# Patient Record
Sex: FEMALE | Race: WHITE
[De-identification: names, ages, dates, MRNs, and addresses within clinical notes are randomized per-mention and may not be internally consistent; named-entity substitution may affect disease eponyms.]

---

## 2018-08-15 LAB
HCT VFR BLD CALC: 36.7 % (ref 36–45)
LYMPHOCYTES # SPEC AUTO: 2 K/UL (ref 0.7–4.9)
MCH RBC QN AUTO: 27.7 PG (ref 27–35)
MCV RBC: 81.1 FL (ref 80–100)
PMV BLD: 9.2 FL (ref 7.6–11.3)
RBC # BLD: 4.53 M/UL (ref 3.86–4.86)
UA COMPLETE W REFLEX CULTURE PNL UR: (no result)
UA DIPSTICK W REFLEX MICRO PNL UR: (no result)

## 2018-08-15 NOTE — EKG
Test Date:    2018-08-15               Test Time:    12:57:40

Technician:   NICOLÁS                                    

                                                     

MEASUREMENT RESULTS:                                       

Intervals:                                           

Rate:         78                                     

SD:           168                                    

QRSD:         90                                     

QT:           404                                    

QTc:          460                                    

Axis:                                                

P:            50                                     

SD:           168                                    

QRS:          -1                                     

T:            8                                      

                                                     

INTERPRETIVE STATEMENTS:                                       

                                                     

Normal sinus rhythm

Normal ECG

No previous ECG available for comparison



Electronically Signed On 08-15-18 14:25:53 CDT by Quique Zamorano

## 2018-08-21 ENCOUNTER — HOSPITAL ENCOUNTER (OUTPATIENT)
Dept: HOSPITAL 97 - OR | Age: 38
Discharge: HOME | End: 2018-08-21
Attending: OBSTETRICS & GYNECOLOGY
Payer: COMMERCIAL

## 2018-08-21 DIAGNOSIS — N93.0: Primary | ICD-10-CM

## 2018-08-21 DIAGNOSIS — Z30.2: ICD-10-CM

## 2018-08-21 DIAGNOSIS — I10: ICD-10-CM

## 2018-08-21 DIAGNOSIS — Z30.432: ICD-10-CM

## 2018-08-21 PROCEDURE — 0DNU4ZZ RELEASE OMENTUM, PERCUTANEOUS ENDOSCOPIC APPROACH: ICD-10-PCS

## 2018-08-21 PROCEDURE — 86850 RBC ANTIBODY SCREEN: CPT

## 2018-08-21 PROCEDURE — 86900 BLOOD TYPING SEROLOGIC ABO: CPT

## 2018-08-21 PROCEDURE — 87088 URINE BACTERIA CULTURE: CPT

## 2018-08-21 PROCEDURE — 36415 COLL VENOUS BLD VENIPUNCTURE: CPT

## 2018-08-21 PROCEDURE — 93005 ELECTROCARDIOGRAM TRACING: CPT

## 2018-08-21 PROCEDURE — 87086 URINE CULTURE/COLONY COUNT: CPT

## 2018-08-21 PROCEDURE — 81015 MICROSCOPIC EXAM OF URINE: CPT

## 2018-08-21 PROCEDURE — 81025 URINE PREGNANCY TEST: CPT

## 2018-08-21 PROCEDURE — 0UT74ZZ RESECTION OF BILATERAL FALLOPIAN TUBES, PERCUTANEOUS ENDOSCOPIC APPROACH: ICD-10-PCS

## 2018-08-21 PROCEDURE — 81003 URINALYSIS AUTO W/O SCOPE: CPT

## 2018-08-21 PROCEDURE — 88302 TISSUE EXAM BY PATHOLOGIST: CPT

## 2018-08-21 PROCEDURE — 85025 COMPLETE CBC W/AUTO DIFF WBC: CPT

## 2018-08-21 PROCEDURE — 86901 BLOOD TYPING SEROLOGIC RH(D): CPT

## 2018-08-21 PROCEDURE — 0UC98ZZ EXTIRPATION OF MATTER FROM UTERUS, VIA NATURAL OR ARTIFICIAL OPENING ENDOSCOPIC: ICD-10-PCS

## 2018-08-21 RX ADMIN — MORPHINE SULFATE ONE MG: 4 INJECTION, SOLUTION INTRAMUSCULAR; INTRAVENOUS at 09:23

## 2018-08-21 RX ADMIN — MORPHINE SULFATE ONE MG: 4 INJECTION, SOLUTION INTRAMUSCULAR; INTRAVENOUS at 09:28

## 2018-08-21 NOTE — OP
Date of Procedure:  08/21/2018



Surgeon:  Colleen Harmon MD



Assistant:  Antonina Franco.



Preoperative Diagnoses:  Retained IUD with short strings and desired sterilization.



Postoperative Diagnoses:  Retained IUD with short strings desired sterilization; and left tubal adhes
ions and omental.



Procedure Performed:  Diagnostic hysteroscopy, then converted to an operative hysteroscopy for remova
l of IUD with the graspers; laparoscopy; bilateral salpingectomy; and lysis of adhesions.



Anesthesia:  General endotracheal.



Specimens:  Bilateral tubes.



Estimated Blood Loss:  Minimal.



Complications:  No complications.



Drains:  None.



Condition:  The patient's condition is stable.



Indications:  The patient is a 38-year-old, completed childbearing with birth control with Mirena in 
place, however, wanted permanent sterilization; does have some pain.  We got consent for removal of t
he tubes or tubal ligation depending on the findings if there were any adhesions or any other patholo
gy that the tubes would be removed, otherwise I would put Falope rings. 



An attempt at removal of IUD in the office failed despite multiple attempts to try to get the strings
 from the canal.  So, a hysteroscopy was planned for removal of IUD.



Description Of Procedure:  After informed consent was verified, she was taken back to the OR, placed 
in a supine fashion on the operating table.  After general anesthesia was given, she was placed in a 
dorsal lithotomy position using Iftikhar stirrups.  Abdomen, perineum, vulva vagina were all prepped and
 draped in a sterile fashion.  Brar was placed to drain the bladder.  A speculum was used to expose 
the cervix.  Anterior lip grasped with 2 Allis clamps, and diagnostic hysteroscopy was performed.  Th
e IUD seen in the uterine cavity.  Then, the sheath changed to an operative sheath.  Using graspers, 
the strings that were tucked into the uterine cavity were pulled out and the entire IUD came out with
out any problem.  The cavity was then visualized after the IUD was pulled out in total.  There is nor
mal cavity without any intracavitary lesions.  Both tubal ostia were visualized.  The scope removed a
nd diagnostic VCare placed. 



A 1-cm infraumbilical incision was made using the scalpel; the fascial edges were tagged with 0 Vicry
l.  Peritoneum entered bluntly and S retractors placed.  Maryellen introduced.  Site of entry was checke
d and was unremarkable.  Liver and gallbladder were checked as well and normal.  The patient was plac
ed in [facility].  There were adhesions to the anterior abdominal wall and the cystic structure here from
 an adhesion of the left tube to the omentum and the omentum to the anterior abdominal wall.  These a
dhesions were taken down, excised.  Then, the tubes were visualized.  Plan was to see if there was an
y pathology to remove the tubes, slightly dilated and erythematous on the left side.  After the 2 sup
rapubic and left lower quadrant port, 5 mm, ports were placed under direct vision, we went ahead and 
then did the excision of the adhesions.  Then, using the basket tip bipolar, the mesosalpinx was caut
erized all the way and the tube was cut with scissors.  First, the right, then the left tubes were re
moved and handed out for permanent pathology.  Thorough inspection showed excellent hemostasis.  Ovar
ies normal.  No other pathology was found.  The slight cystic change on the serosal edge of the poste
rior aspect of the uterus with no endometriosis was seen. 



The trocars were removed under direct vision, gas desufflated, Maryellen removed.  Fascia closed with 0 
Vicryl in a figure-of-eight fashion.  Subcutaneous tissue was brought with a simple 0 Vicryl stitch. 
 The skin incisions, all 3 closed with 4-0 Monocryl.  VCare and Brar were removed.  The patient robert
shivani from anesthesia after instrument, needle, and sponge counts x3 were correct at the end of the c
ase.  The 

patient tolerated the procedure well.  She will follow up with me in 1 week.





SK/LAYNE

DD:  08/21/2018 09:07:29Voice ID:  [id number]

DT:  08/21/2018 19:24:15Report ID:  [id number]

## 2019-07-16 ENCOUNTER — HOSPITAL ENCOUNTER (INPATIENT)
Dept: HOSPITAL 97 - ER | Age: 39
LOS: 2 days | Discharge: HOME | DRG: 419 | End: 2019-07-18
Attending: FAMILY MEDICINE | Admitting: HOSPITALIST
Payer: COMMERCIAL

## 2019-07-16 DIAGNOSIS — K80.00: Primary | ICD-10-CM

## 2019-07-16 DIAGNOSIS — I10: ICD-10-CM

## 2019-07-16 LAB
ALBUMIN SERPL BCP-MCNC: 4 G/DL (ref 3.4–5)
ALP SERPL-CCNC: 74 U/L (ref 45–117)
ALT SERPL W P-5'-P-CCNC: 88 U/L (ref 12–78)
AST SERPL W P-5'-P-CCNC: 80 U/L (ref 15–37)
BUN BLD-MCNC: 10 MG/DL (ref 7–18)
GLUCOSE SERPLBLD-MCNC: 119 MG/DL (ref 74–106)
HCT VFR BLD CALC: 38.9 % (ref 36–45)
LIPASE SERPL-CCNC: 165 U/L (ref 73–393)
LYMPHOCYTES # SPEC AUTO: 1.7 K/UL (ref 0.7–4.9)
PMV BLD: 9.4 FL (ref 7.6–11.3)
POTASSIUM SERPL-SCNC: 3.4 MMOL/L (ref 3.5–5.1)
RBC # BLD: 4.81 M/UL (ref 3.86–4.86)

## 2019-07-16 PROCEDURE — 88304 TISSUE EXAM BY PATHOLOGIST: CPT

## 2019-07-16 PROCEDURE — 81025 URINE PREGNANCY TEST: CPT

## 2019-07-16 PROCEDURE — 96375 TX/PRO/DX INJ NEW DRUG ADDON: CPT

## 2019-07-16 PROCEDURE — 96365 THER/PROPH/DIAG IV INF INIT: CPT

## 2019-07-16 PROCEDURE — 80076 HEPATIC FUNCTION PANEL: CPT

## 2019-07-16 PROCEDURE — 80048 BASIC METABOLIC PNL TOTAL CA: CPT

## 2019-07-16 PROCEDURE — 36415 COLL VENOUS BLD VENIPUNCTURE: CPT

## 2019-07-16 PROCEDURE — 74181 MRI ABDOMEN W/O CONTRAST: CPT

## 2019-07-16 PROCEDURE — 85025 COMPLETE CBC W/AUTO DIFF WBC: CPT

## 2019-07-16 PROCEDURE — 96361 HYDRATE IV INFUSION ADD-ON: CPT

## 2019-07-16 PROCEDURE — 81003 URINALYSIS AUTO W/O SCOPE: CPT

## 2019-07-16 PROCEDURE — 99285 EMERGENCY DEPT VISIT HI MDM: CPT

## 2019-07-16 PROCEDURE — 96366 THER/PROPH/DIAG IV INF ADDON: CPT

## 2019-07-16 PROCEDURE — 80053 COMPREHEN METABOLIC PANEL: CPT

## 2019-07-16 PROCEDURE — 83690 ASSAY OF LIPASE: CPT

## 2019-07-16 PROCEDURE — 76705 ECHO EXAM OF ABDOMEN: CPT

## 2019-07-16 RX ADMIN — SODIUM CHLORIDE SCH MLS: 0.9 INJECTION, SOLUTION INTRAVENOUS at 22:20

## 2019-07-16 RX ADMIN — Medication SCH ML: at 22:21

## 2019-07-16 NOTE — EDPHYS
Physician Documentation                                                                           

 [facility]                                                                 

Name: Oumou Alcala                                                                             

Age: 39 yrs                                                                                       

Sex: Female                                                                                       

: 1980                                                                                   

MRN: [id number]                                                                                   

Arrival Date: 2019                                                                          

Time: 15:11                                                                                       

Account#: [id number]                                                                            

Bed CT                                                                                            

Private MD:                                                                                       

ED Physician Vern Spears                                                                      

HPI:                                                                                              

                                                                                             

16:00 This 39 yrs old  Female presents to ER via EMS with complaints of Abdominal    pm1 

      Pain, Nausea/Vomiting.                                                                      

16:00 The patient presents with abdominal pain in the epigastric area. Onset: The             pm1 

      symptoms/episode began/occurred 1 hour prior to arrival. The symptoms radiate to            

      Associated signs and symptoms: Pertinent positives: nausea and vomiting, Pertinent          

      negatives: chest pain, constipation, diarrhea, dysuria, fever, shortness of breath. The     

      symptoms are described as crampy, sharp. Modifying factors: The symptoms are alleviated     

      by nothing, the symptoms are aggravated by nothing. Severity of pain: in the emergency      

      department the pain is actually worse. The patient has not experienced similar symptoms     

      in the past. The patient has not recently seen a physician.                                 

                                                                                                  

Historical:                                                                                       

- Allergies:                                                                                      

15:13 TETRACYCLINES;                                                                          ss  

- Home Meds:                                                                                      

15:13 amlodipine oral [Active];                                                               ss  

- PMHx:                                                                                           

15:13 Hypertension;                                                                           ss  

- PSHx:                                                                                           

15:13 Tubal ligation;                                                                         ss  

                                                                                                  

- Immunization history:: Adult Immunizations up to date.                                          

- Social history:: Smoking status: Patient/guardian denies using tobacco.                         

- Ebola Screening: : Patient denies exposure to infectious person Patient denies travel           

  to an Ebola-affected area in the 21 days before illness onset.                                  

                                                                                                  

                                                                                                  

ROS:                                                                                              

16:00 Constitutional: Negative for fever, chills, and weight loss, Eyes: Negative for injury, pm1 

      pain, redness, and discharge, ENT: Negative for injury, pain, and discharge, Neck:          

      Negative for injury, pain, and swelling, Cardiovascular: Negative for chest pain,           

      palpitations, and edema, Respiratory: Negative for shortness of breath, cough,              

      wheezing, and pleuritic chest pain.                                                         

16:00 Back: Negative for injury and pain, : Negative for injury, bleeding, discharge, and       

      swelling, MS/Extremity: Negative for injury and deformity, Skin: Negative for injury,       

      rash, and discoloration, Neuro: Negative for headache, weakness, numbness, tingling,        

      and seizure.                                                                                

16:00 Abdomen/GI: Positive for abdominal pain, nausea and vomiting, Negative for diarrhea,        

      constipation.                                                                               

                                                                                                  

Exam:                                                                                             

16:00 Constitutional:  This is a well developed, well nourished patient who is awake, alert,  pm1 

      and in no acute distress. Head/Face:  Normocephalic, atraumatic. Eyes:  Pupils equal        

      round and reactive to light, extra-ocular motions intact.  Lids and lashes normal.          

      Conjunctiva and sclera are non-icteric and not injected.  Cornea within normal limits.      

      Periorbital areas with no swelling, redness, or edema. ENT:  Nares patent. No nasal         

      discharge, no septal abnormalities noted.  Tympanic membranes are normal and external       

      auditory canals are clear.  Oropharynx with no redness, swelling, or masses, exudates,      

      or evidence of obstruction, uvula midline.  Mucous membranes moist. Neck:  Trachea          

      midline, no thyromegaly or masses palpated, and no cervical lymphadenopathy.  Supple,       

      full range of motion without nuchal rigidity, or vertebral point tenderness.  No            

      Meningismus. Chest/axilla:  Normal chest wall appearance and motion.  Nontender with no     

      deformity.  No lesions are appreciated. Cardiovascular:  Regular rate and rhythm with a     

      normal S1 and S2.  No gallops, murmurs, or rubs.  Normal PMI, no JVD.  No pulse             

      deficits. Respiratory:  Lungs have equal breath sounds bilaterally, clear to                

      auscultation and percussion.  No rales, rhonchi or wheezes noted.  No increased work of     

      breathing, no retractions or nasal flaring.                                                 

16:00 Back:  No spinal tenderness.  No costovertebral tenderness.  Full range of motion.          

      Skin:  Warm, dry with normal turgor.  Normal color with no rashes, no lesions, and no       

      evidence of cellulitis. MS/ Extremity:  Pulses equal, no cyanosis.  Neurovascular           

      intact.  Full, normal range of motion.                                                      

16:00 Abdomen/GI: Inspection: abdomen appears normal, Bowel sounds: normal, Palpation: soft,      

      moderate abdominal tenderness, in the epigastric area, mass, is not appreciated,            

      rebound tenderness, is not appreciated.                                                     

16:00 Neuro: Orientation: is normal, Motor: is normal, moves all fours, Sensation: is normal,     

      no obvious gross deficits.                                                                  

                                                                                                  

Vital Signs:                                                                                      

15:13  / 71; Pulse 79; Resp 20; Temp 98.5(O); Pulse Ox 100% on R/A; Weight 87.54 kg;    ss  

      Height 5 ft. 6 in. (167.64 cm);                                                             

16:53  / 80; Pulse 74; Resp 16; Pulse Ox 98% on R/A;                                    la1 

18:55  / 70; Pulse 62; Resp 16; Pulse Ox 98% on R/A;                                    la1 

19:56  / 72; Pulse 61; Resp 16; Pulse Ox 96% on R/A;                                    jb4 

20:45  / 74; Pulse 63; Resp 16; Temp 99.1(O); Pulse Ox 98% on R/A;                      jb4 

15:13 Body Mass Index 31.15 (87.54 kg, 167.64 cm)                                             ss  

                                                                                                  

MDM:                                                                                              

15:26 Patient medically screened.                                                             [facility] 

16:50 Data reviewed: vital signs. Data interpreted: Pulse oximetry: on room air is 100 %.     pm1 

      Interpretation: normal. Counseling: I had a detailed discussion with the patient and/or     

      guardian regarding: the historical points, exam findings, and any diagnostic results        

      supporting the discharge/admit diagnosis, lab results, radiology results, the need for      

      further work-up and treatment in the hospital.                                              

17:14 Physician consultation: Oliver Reynolds MD was called at 16:57, was contacted at 17:15,  pm1 

      regarding consult, patient's condition, and will see patient tomorrow, would like           

      admission per Dr. Elzbieta Kee MD would like further tests performed, MRCP, GI consult     

      - Catarino is oncall, NPO, IV fluids, abx - zosyn.                                            

17:50 Physician consultation: Elzbieta Kee MD was called at 17:15, was contacted at 17:50,   pm1 

      regarding admission, wants to know results of MRCP prior to admission.                      

19:45 Physician consultation: Mary Medina MD was called at 19:48, was contacted at 19:45,   pm1 

      regarding admission, patient's condition.                                                   

                                                                                                  

                                                                                             

15:23 Order name: Urine Dipstick--Ancillary (enter results); Complete Time: 17:19             bd  

                                                                                             

15:23 Order name: Urine Pregnancy--Ancillary (enter results); Complete Time: 17:19            bd  

                                                                                             

15:39 Order name: Basic Metabolic Panel; Complete Time: 16:25                                 pm1 

                                                                                             

15:39 Order name: CBC with Diff; Complete Time: 16:03                                         pm1 

                                                                                             

15:39 Order name: Creatinine for Radiology; Complete Time: 16:15                              pm1 

                                                                                             

15:39 Order name: Hepatic Function; Complete Time: 16:25                                      pm1 

                                                                                             

15:39 Order name: Lipase; Complete Time: 16:25                                                pm1 

                                                                                             

15:39 Order name: US Abdomen Limited; Complete Time: 16:41                                    pm1 

                                                                                             

16:57 Order name: Cholangiogram; Complete Time: 19:38                                         EDMS

                                                                                             

15:39 Order name: IV Saline Lock; Complete Time: 15:52                                        pm1 

                                                                                             

15:39 Order name: Labs collected and sent; Complete Time: 15:52                               pm1 

                                                                                             

20:22 Order name: CONS Pharmacy Consult                                                       EDMS

                                                                                             

20:22 Order name: CONS Physician Consult                                                      EDMS

                                                                                             

20:22 Order name: CONS Physician Consult                                                      EDMS

                                                                                             

20:22 Order name: NPO                                                                         EDMS

                                                                                                  

Administered Medications:                                                                         

15:51 Drug: NS 0.9% 1000 ml Route: IV; Rate: 1000 ml; Site: left antecubital;                 la1 

16:53 Follow up: IV Status: Completed infusion                                                la1 

15:51 Drug: Zofran 4 mg Route: IVP; Site: left antecubital;                                   la1 

16:53 Follow up: Response: No adverse reaction                                                la1 

15:52 Drug: morphine 4 mg Route: IVP; Site: left antecubital;                                 la1 

16:53 Follow up: Response: No adverse reaction; Pain is decreased                             la1 

16:53 Drug: Zosyn 3.375 grams Route: IVPB; Infused Over: 60 mins; Site: left antecubital;     la1 

18:55 Follow up: IV Status: Completed infusion                                                la1 

17:06 Drug: morphine 4 mg Route: IVP; Site: left antecubital;                                 la1 

18:55 Follow up: Response: No adverse reaction; Pain is decreased                             la1 

20:49 Drug: morphine 4 mg Route: IVP; Site: left antecubital;                                 jb4 

21:00 Follow up: Response: No adverse reaction; Pain is decreased                             jb4 

                                                                                                  

                                                                                                  

Disposition:                                                                                      

                                                                                             

05:51 Co-signature as Attending Physician, Vern Spears MD I agree with the assessment and  gretchen 

      plan of care.                                                                               

                                                                                                  

Disposition:                                                                                      

19 17:19 Hospitalization ordered by Mary Medina for Observation. Preliminary             

  diagnosis is Cholelithiasis.                                                                    

- Bed requested for Telemetry/MedSurg (observation).                                              

- Status is Observation.                                                                      jb4 

- Condition is Stable.                                                                            

- Problem is new.                                                                                 

- Symptoms have improved.                                                                         

UTI on Admission? No                                                                              

                                                                                                  

                                                                                                  

                                                                                                  

Signatures:                                                                                       

Dispatcher [employer]                           EDMS                                                 

Arlene Brown RN RN mw Anderson, Corey, MD MD cha Smirch, Shelby, RN RN                                                      

Karthik Ames RN                         RN   la1                                                  

Gurmeet Figueroa, NP                    NP   pm1                                                  

Taras Aggarwal RN                       RN   jb4                                                  

                                                                                                  

Corrections: (The following items were deleted from the chart)                                    

                                                                                             

19:46 17:19 Hospitalization Ordered by Elzbieta Kee MD for Observation. Preliminary          pm1 

      diagnosis is Cholelithiasis. Bed requested for Telemetry/MedSurg (observation). Status      

      is Observation. Condition is Stable. Problem is new. Symptoms have improved. UTI on         

      Admission? No. pm1                                                                          

20:27 19:46 2019 17:19 Hospitalization Ordered by Mary Medina MD for Observation.     mw  

      Preliminary diagnosis is Cholelithiasis. Bed requested for Telemetry/MedSurg                

      (observation). Status is Observation. Condition is Stable. Problem is new. Symptoms         

      have improved. UTI on Admission? No. pm1                                                    

21:29 20:27 2019 17:19 Hospitalization Ordered by Mary Medina MD for Observation.     jb4 

      Preliminary diagnosis is Cholelithiasis. Bed requested for Telemetry/MedSurg                

      (observation). Status is Observation. Condition is Stable. Problem is new. Symptoms         

      have improved. UTI on Admission? No. jesus                                                     

                                                                                                  

**************************************************************************************************

## 2019-07-16 NOTE — RAD REPORT
EXAM DESCRIPTION:  MRI - Cholangiogram - 7/16/2019 7:23 pm

 

CLINICAL HISTORY:  abd pain

 

COMPARISON:  Abdomen Exam Limited dated 7/16/2019

 

FINDINGS:  Three-dimensional MRCP was performed using maximum intensity projection reconstruction on 
the same work station.

 

No intrahepatic biliary tree dilatation is seen. The common bile duct is normal caliber without evide
nce of retained stone, stricture or mass. The pancreatic duct is not pathologically dilated.

 

Cholelithiasis.

 

Limited T2 sequences through the abdomen demonstrates no bulky adenopathy, significant free fluid or 
abscess.

 

 

IMPRESSION:  Cholelithiasis.

 

No finding to indicate choledocholithiasis or other pathologic biliary tree finding.

## 2019-07-16 NOTE — RAD REPORT
EXAM DESCRIPTION:  US - Abdomen Exam Limited - 7/16/2019 4:19 pm

 

CLINICAL HISTORY:  RUQ pain

 

COMPARISON:  <Comparisons>

 

FINDINGS:  The gallbladder demonstrates small shadowing stones in the gallbladder neck. No pericholec
ystic fluid or gallbladder wall thickening. The common bile duct is normal measuring 6 mm.

 

The liver demonstrates no findings of intrahepatic biliary dilatation.

 

IMPRESSION:  Cholelithiasis.

 

Upper limit of normal common bile duct could indicate choledocholithiasis. If there are clinical or l
aboratory findings suspicious for biliary obstruction, MRCP may be of value.

## 2019-07-16 NOTE — ER
Nurse's Notes                                                                                     

 [facility]                                                                 

Name: Oumou Alcala                                                                             

Age: 39 yrs                                                                                       

Sex: Female                                                                                       

: 1980                                                                                   

MRN: [id number]                                                                                   

Arrival Date: 2019                                                                          

Time: 15:11                                                                                       

Account#: [id number]                                                                            

Bed CT                                                                                            

Private MD:                                                                                       

Diagnosis: Cholelithiasis                                                                         

                                                                                                  

Presentation:                                                                                     

                                                                                             

15:11 Presenting complaint: Patient states: pain to R mid back that radiates through to       ss  

      abdomen. Began an hour ago. Pt c/o intermittent N/V. Transition of care: patient was        

      not received from another setting of care. Onset of symptoms was 2019. Risk        

      Assessment: Do you want to hurt yourself or someone else? Patient reports no desire to      

      harm self or others. Initial Sepsis Screen: Does the patient meet any 2 criteria? No.       

      Patient's initial sepsis screen is negative. Does the patient have a suspected source       

      of infection? No. Patient's initial sepsis screen is negative. Care prior to arrival:       

      None.                                                                                       

15:11 Method Of Arrival: EMS: [facility]                                                         

15:11 Acuity: DEMARCUS 3                                                                           ss  

                                                                                                  

Historical:                                                                                       

- Allergies:                                                                                      

15:13 TETRACYCLINES;                                                                          ss  

- Home Meds:                                                                                      

15:13 amlodipine oral [Active];                                                               ss  

- PMHx:                                                                                           

15:13 Hypertension;                                                                           ss  

- PSHx:                                                                                           

15:13 Tubal ligation;                                                                         ss  

                                                                                                  

- Immunization history:: Adult Immunizations up to date.                                          

- Social history:: Smoking status: Patient/guardian denies using tobacco.                         

- Ebola Screening: : Patient denies exposure to infectious person Patient denies travel           

  to an Ebola-affected area in the 21 days before illness onset.                                  

                                                                                                  

                                                                                                  

Screening:                                                                                        

15:53 Abuse screen: Denies threats or abuse. Nutritional screening: No deficits noted.        la1 

      Tuberculosis screening: No symptoms or risk factors identified. Fall Risk None              

      identified.                                                                                 

                                                                                                  

Assessment:                                                                                       

15:52 General: Appears in no apparent distress. Behavior is calm, cooperative. Pain:          la1 

      Complains of pain in epigastric area, right upper quadrant and left upper quadrant Pain     

      radiates to back. Neuro: Level of Consciousness is awake, alert, obeys commands,            

      Oriented to person, place, time, situation. Cardiovascular: Capillary refill < 3            

      seconds Patient's skin is warm and dry. Respiratory: Airway is patent Respiratory           

      effort is even, unlabored, Respiratory pattern is regular, symmetrical. GI: Abdomen is      

      round non-distended, Bowel sounds present X 4 quads. Abd is soft X 4 quads Reports          

      nausea, vomiting.                                                                           

18:56 Reassessment: Patient appears in no apparent distress at this time. No changes from     la1 

      previously documented assessment. Patient and/or family updated on plan of care and         

      expected duration. Pain level reassessed. Patient is alert, oriented x 3, equal             

      unlabored respirations, skin warm/dry/pink.                                                 

19:00 Reassessment: Patient appears in no apparent distress at this time. Patient and/or      jb4 

      family updated on plan of care and expected duration. Pain level reassessed. Patient is     

      alert, oriented x 3, equal unlabored respirations, skin warm/dry/pink. PT to MRI.           

20:40 Reassessment: Pt reports and increase in pain, Provider notified see MAR for orders.    jb4 

21:10 Reassessment: Patient appears in no apparent distress at this time. Patient and/or      jb4 

      family updated on plan of care and expected duration. Pain level reassessed. Patient is     

      alert, oriented x 3, equal unlabored respirations, skin warm/dry/pink. Report called to     

      HEYDI Shoemaker.                                                                                 

21:27 Reassessment: Pt transferred upstairs via wheelchair with tech, belongings with         jb4 

      . IV flushes with ease and draws blood.                                              

                                                                                                  

Vital Signs:                                                                                      

15:13  / 71; Pulse 79; Resp 20; Temp 98.5(O); Pulse Ox 100% on R/A; Weight 87.54 kg;    ss  

      Height 5 ft. 6 in. (167.64 cm);                                                             

16:53  / 80; Pulse 74; Resp 16; Pulse Ox 98% on R/A;                                    la1 

18:55  / 70; Pulse 62; Resp 16; Pulse Ox 98% on R/A;                                    la1 

19:56  / 72; Pulse 61; Resp 16; Pulse Ox 96% on R/A;                                    jb4 

20:45  / 74; Pulse 63; Resp 16; Temp 99.1(O); Pulse Ox 98% on R/A;                      jb4 

15:13 Body Mass Index 31.15 (87.54 kg, 167.64 cm)                                               

                                                                                                  

ED Course:                                                                                        

15:11 Patient arrived in ED.                                                                  ss  

15:12 Triage completed.                                                                       ss  

15:13 Arm band placed on right wrist.                                                         ss  

15:15 Urine collected: clean catch specimen, clear.                                           dh3 

15:24 Gurmeet Figueroa, CAIT is PHCP.                                                           pm1 

15:24 Vern Spears MD is Attending Physician.                                             pm1 

15:41 Karthik Ames, HEYDI is Primary Nurse.                                                       la1 

15:47 Initial lab(s) drawn, by me, sent to lab.                                               dh3 

15:53 Call light in reach. Side rails up X 1. Pulse ox on. NIBP on.                           la1 

16:19 US Abdomen Limited In Process Unspecified.                                              EDMS

16:19 Ultrasound completed.                                                                   hr  

17:18 Elzbieta Kee MD is Hospitalizing Provider.                                           pm1 

19:07 Patient moved to MRI via wheelchair.                                                    vm2 

19:23 Cholangiogram In Process Unspecified.                                                   EDMS

19:46 Mary Medina MD is Hospitalizing Provider.                                           pm1 

21:27 No provider procedures requiring assistance completed. Patient admitted, IV remains in  jb4 

      place.                                                                                      

                                                                                                  

Administered Medications:                                                                         

15:51 Drug: NS 0.9% 1000 ml Route: IV; Rate: 1000 ml; Site: left antecubital;                 la1 

16:53 Follow up: IV Status: Completed infusion                                                la1 

15:51 Drug: Zofran 4 mg Route: IVP; Site: left antecubital;                                   la1 

16:53 Follow up: Response: No adverse reaction                                                la1 

15:52 Drug: morphine 4 mg Route: IVP; Site: left antecubital;                                 la1 

16:53 Follow up: Response: No adverse reaction; Pain is decreased                             la1 

16:53 Drug: Zosyn 3.375 grams Route: IVPB; Infused Over: 60 mins; Site: left antecubital;     la1 

18:55 Follow up: IV Status: Completed infusion                                                la1 

17:06 Drug: morphine 4 mg Route: IVP; Site: left antecubital;                                 la1 

18:55 Follow up: Response: No adverse reaction; Pain is decreased                             la1 

20:49 Drug: morphine 4 mg Route: IVP; Site: left antecubital;                                 jb4 

21:00 Follow up: Response: No adverse reaction; Pain is decreased                             jb4 

                                                                                                  

                                                                                                  

Outcome:                                                                                          

17:19 Decision to Hospitalize by Provider.                                                    pm1 

21:27 Admitted to Tele accompanied by tech, via wheelchair, room 411, with chart, Report      jb4 

      called to  HEYDI Shoemaker                                                                       

21:27 Condition: stable                                                                           

21:27 Instructed on the need for admit, Demonstrated understanding of instructions.               

21:29 Patient left the ED.                                                                    jb4 

                                                                                                  

Signatures:                                                                                       

Dispatcher [employer]                           EDMS                                                 

Juana Hurd Shelby, RN RN   ss                                                   

Karthik Ames RN                         RN   la1                                                  

Gurmeet Figueroa, NP                    NP   pm1                                                  

Taras Aggarwal RN RN   jb4                                                  

Jacqueline Monk                            [facility]                                                  

Debo Nash                              [facility]                                                  

                                                                                                  

**************************************************************************************************

## 2019-07-17 LAB
ALBUMIN SERPL BCP-MCNC: 3.6 G/DL (ref 3.4–5)
ALP SERPL-CCNC: 81 U/L (ref 45–117)
ALT SERPL W P-5'-P-CCNC: 194 U/L (ref 12–78)
AST SERPL W P-5'-P-CCNC: 117 U/L (ref 15–37)
BUN BLD-MCNC: 7 MG/DL (ref 7–18)
GLUCOSE SERPLBLD-MCNC: 88 MG/DL (ref 74–106)
HCT VFR BLD CALC: 37.4 % (ref 36–45)
LYMPHOCYTES # SPEC AUTO: 2.4 K/UL (ref 0.7–4.9)
PMV BLD: 9.4 FL (ref 7.6–11.3)
POTASSIUM SERPL-SCNC: 3.5 MMOL/L (ref 3.5–5.1)
RBC # BLD: 4.59 M/UL (ref 3.86–4.86)

## 2019-07-17 PROCEDURE — 0FT44ZZ RESECTION OF GALLBLADDER, PERCUTANEOUS ENDOSCOPIC APPROACH: ICD-10-PCS

## 2019-07-17 RX ADMIN — LEVOFLOXACIN SCH MLS: 5 INJECTION, SOLUTION INTRAVENOUS at 00:12

## 2019-07-17 RX ADMIN — METRONIDAZOLE SCH MLS: 500 INJECTION, SOLUTION INTRAVENOUS at 05:36

## 2019-07-17 RX ADMIN — METRONIDAZOLE SCH MLS: 500 INJECTION, SOLUTION INTRAVENOUS at 16:54

## 2019-07-17 RX ADMIN — METRONIDAZOLE SCH MLS: 500 INJECTION, SOLUTION INTRAVENOUS at 00:13

## 2019-07-17 RX ADMIN — Medication SCH: at 21:00

## 2019-07-17 RX ADMIN — SODIUM CHLORIDE SCH MLS: 0.9 INJECTION, SOLUTION INTRAVENOUS at 15:38

## 2019-07-17 RX ADMIN — Medication SCH ML: at 09:17

## 2019-07-17 RX ADMIN — METRONIDAZOLE SCH MLS: 500 INJECTION, SOLUTION INTRAVENOUS at 12:26

## 2019-07-17 RX ADMIN — LEVOFLOXACIN SCH MLS: 5 INJECTION, SOLUTION INTRAVENOUS at 21:01

## 2019-07-17 NOTE — P.BOP
Preoperative diagnosis: acute cholecysytitis, symptomatic cholelithiasis


Postoperative diagnosis: same


Primary procedure: Laparoscopic cholecystectomy


Assistant: MARY ANNE MULLEN (RNFA)


Estimated blood loss: <10cc


Specimen: gb


Findings: as above


Anesthesia: General


Complications: None


Transferred to: Recovery Room


Condition: Good

## 2019-07-17 NOTE — CON
Date of Consultation:  07/17/2019



Reason For Service:  Acute cholecystitis, symptomatic cholelithiasis.



History Of Present Illness:  This is a case of a 39-year-old patient, comes to the hospital complaini
ng epigastric pain radiating to the back associated with nausea and vomiting, intractable, associated
 with fever.  The patient was seen in the ER, admitted for above diagnosis and surgical consult was o
btained for cholecystectomy.  She only ate some __________ before this.  She denies eating anything e
lse out of the usual.  She denies any recent traveling out of the country.  Denies any family member 
sick at home.



Allergies:  TETRACYCLINES.



Medications:  Amlodipine.



Past Medical History:  Hypertension.



Past Surgical History:  Bilateral tubal ligation.



Family History:  Hypertension.



Social History:  She does not smoke.  She does not drink alcohol.



Review of Systems:

Ten points were otherwise unremarkable.



Physical Examination:

General:  Patient is awake and alert. 

HEENT:  Pupils are equal and reactive, anicteric. 

Neck:  Supple. 

Chest:  Clear. 

Abdomen:  Epigastric right upper quadrant pain with Norman sign positive. 

Breasts:  Deferred. 

Rectal:  Deferred. 

Pelvic:  Deferred. 

Extremities:  Good capillary refill.



Laboratory Data:  Blood work shows WBC count of 10.6, hemoglobin of 12.7, platelets of 251.  [place]
m 3.4 with increased LFTs.  Lipase 165.  UA, nitrite negative.  Ultrasound of the abdomen shows acute
 cholecystitis, cholelithiasis with the upper limit of common bile duct.  Could indicate choledocholi
thiasis.  An MRCP recommended.  An MRCP done and interpreted by  __________ as cholelithiasis.  No
 findings to indicate choledocholithiasis or any other pathological biliary tree finding.



Assessment:  A 39-year-old patient with acute cholecystitis, symptomatic cholelithiasis.  The patient
 wants to have surgery done since the pain is intractable.  The benefits, alternatives, and risks of 
laparoscopic possible open cholecystectomy fully explained to the patient, which include but are not 
limited to, infection, bleeding, damage to adjacent structures, anesthesia complication, cholelithias
is, bile leak, pancreatitis, MI, and even death.  She also understands this may not relieve any sympt
oms.  She might need more than one surgical intervention.  The OR was notified.





/LAYNE

DD:  07/17/2019 11:42:03Voice ID:  [id number]

DT:  07/17/2019 22:00:58Report ID:  [id number]

## 2019-07-17 NOTE — P.HP
Certification for Inpatient


Patient admitted to: Inpatient


With expected LOS: >2 Midnights


Patient will require the following post-hospital care: None


Practitioner: I am a practitioner with admitting privileges, knowledge of 

patient current condition, hospital course, and medical plan of care.


Services: Services provided to patient in accordance with Admission 

requirements found in Title 42 Section 412.3 of the Code of Federal Regulations





Patient History


Date of Service: 07/16/19


Reason for admission: Acute cholecystitis


History of Present Illness: 





Patient is a 39-year-old female who comes into the hospital with abdominal 

pain. Pain was mainly in the right upper quadrant.  She had pain on inspiration 

in her went to her back.  She came into the emergency room for further 

evaluation.  In the emergency room her workup revealed cholelithiasis with 

acute cholecystitis.  Her LFTs were elevated.  She is having some fever, shakes 

and chills.  She will get started on IV antibiotics and will get General 

surgery consultation.  She will probably need a laparoscopic cholecystectomy.  

Depending on findings  will determine the extent of antibiotic therapy.  

Patient will be admitted to the hospital for further workup.  Patient has no 

cardiopulmonary diseases.  Patient is low risk for cardiopulmonary 

complications.  Benefits of surgery outweigh the risks.


Allergies





Tetracyclines Allergy (Verified 07/16/19 21:47)


 Rash





Home Medications: 








Amlodipine Besylate 5 mg PO BEDTIME 08/15/18 


Loratadine 10 mg PO BEDTIME 08/15/18 








- Past Medical/Surgical History


Has patient received pneumonia vaccine in the past: No


Diabetic: No


-: Hypertension


-: seasonal allergy


-: Bilateral tubal ligation





- Family History


  ** Father


History Unknown: Yes


Medical History: Hypertension, Other (see notes)


Notes: high cholesterol





  ** Mother


History Unknown: Yes


Medical History: Hypertension


Notes: borderline DM





- Social History


Smoking Status: Never smoker


Alcohol use: No


CD- Drugs: No


Place of Residence: Home





Review of Systems


10-point ROS is otherwise unremarkable





Physical Examination





- Vital Signs


Temperature: 97.6 F


Blood Pressure: 115/58


Pulse: 64


Respirations: 16


Pulse Ox (%): 100





- Physical Exam


General: Alert, In no apparent distress, Oriented x3


HEENT: Atraumatic, PERRLA, Mucous membr. moist/pink, EOMI, Sclerae nonicteric


Neck: Supple, 2+ carotid pulse no bruit, No LAD, Without JVD or thyroid 

abnormality


Respiratory: Clear to auscultation bilaterally, Normal air movement


Cardiovascular: Regular rate/rhythm, Normal S1 S2, No murmurs


Gastrointestinal: Normal bowel sounds, Soft and benign, No guarding, Distended, 

Tenderness, Rebound


Musculoskeletal: No clubbing, No swelling, No tenderness


Integumentary: No rashes


Neurological: Normal gait, Normal speech, Normal strength at 5/5 x4 extr, 

Normal tone, Sensation intact, Cranial nerves 3-12 intact, Normal affect


Lymphatics: No axilla or inguinal lymphadenopathy





- Studies


Laboratory Data (last 24 hrs)





07/16/19 15:47: Creatinine 0.87


07/16/19 15:47: WBC 10.6, Hgb 12.7, Hct 38.9, Plt Count 251


07/16/19 15:47: Sodium 140, Potassium 3.4 L, BUN 10, Creatinine 0.92, Glucose 

119 H, Total Bilirubin 0.8, AST 80 H, ALT 88 H, Alkaline Phosphatase 74, Lipase 

165








Assessment & Plan





- Problems (Diagnosis)


(1) Acute cholecystitis due to biliary calculus


Current Visit: Yes   Status: Acute   





- Plan





1. Continue with IV hydration 


2. Continue with IV antibiotics


3. Continue with pain control


4. NPO


5. General surgery consultation; 


6. Monitor CBC, BMP, LFTs and lipase along with electrolytes.


7. GI and DVT prophylaxis


Discharge Plan: Home


Plan to discharge in: Greater than 2 days





- Advance Directives


Does patient have a Living Will: No


Does patient have a Durable POA for Healthcare: No





- Code Status/Comfort Care


Code Status Assessed: Yes


Code Status: Full Code


Critical Care: No


Time Spent Managing PTS Care (In Minutes): 40

## 2019-07-18 RX ADMIN — METRONIDAZOLE SCH: 500 INJECTION, SOLUTION INTRAVENOUS at 00:00

## 2019-07-18 RX ADMIN — TRAMADOL HYDROCHLORIDE PRN MG: 50 TABLET, COATED ORAL at 01:52

## 2019-07-18 RX ADMIN — Medication SCH: at 08:01

## 2019-07-18 RX ADMIN — TRAMADOL HYDROCHLORIDE PRN MG: 50 TABLET, COATED ORAL at 08:00

## 2019-07-18 NOTE — P.DS
Admission Date: 07/16/19


Discharge Date: 07/18/19


Disposition: ROUTINE DISCHARGE


Discharge Condition: GOOD


Reason for Admission: Acute cholecystitis


Consultations: 





General Surgery   


Procedures: 





Lap Apoorva   





- Problems


(1) Acute cholecystitis due to biliary calculus


Current Visit: Yes   Status: Acute   


Brief History of Present Illness: 





Patient is a 39-year-old female who comes into the hospital with abdominal 

pain. Pain was mainly in the right upper quadrant.  She had pain on inspiration 

in her went to her back.  She came into the emergency room for further 

evaluation.  In the emergency room her workup revealed cholelithiasis with 

acute cholecystitis.  Her LFTs were elevated.  She is having some fever, shakes 

and chills.  She will get started on IV antibiotics and will get General 

surgery consultation.  She will probably need a laparoscopic cholecystectomy.  

Depending on findings  will determine the extent of antibiotic therapy.  

Patient will be admitted to the hospital for further workup.  Patient has no 

cardiopulmonary diseases.  Patient is low risk for cardiopulmonary 

complications.  Benefits of surgery outweigh the risks.


Hospital Course: 





Overall during the hospital stay patient remained stable





Patient was admitted to the hospital for acute cholecystitis had lap choly with 

general surgery did well postprocedure and thus was discharged home under 

stable condition.


Vital Signs/Physical Exam: 














Temp Pulse Resp BP Pulse Ox


 


 97.8 F   75   18   161/84 H  98 


 


 07/18/19 12:00  07/18/19 12:00  07/18/19 12:00  07/18/19 12:00  07/18/19 12:00








General: Alert, In no apparent distress


HEENT: Atraumatic, PERRLA, EOMI


Neck: Supple, JVD not distended


Respiratory: Clear to auscultation bilaterally, Normal air movement


Cardiovascular: Regular rate/rhythm, Normal S1 S2


Gastrointestinal: Normal bowel sounds, No tenderness


Musculoskeletal: No tenderness


Integumentary: No rashes


Neurological: Normal speech, Normal tone, Normal affect


Lymphatics: No axilla or inguinal lymphadenopathy


Laboratory Data at Discharge: 














WBC  7.2 K/uL (4.3-10.9)  D 07/17/19  05:26    


 


Hgb  12.5 g/dL (12.0-15.0)   07/17/19  05:26    


 


Hct  37.4 % (36.0-45.0)   07/17/19  05:26    


 


Plt Count  254 K/uL (152-406)   07/17/19  05:26    


 


Sodium  141 mmol/L (136-145)   07/17/19  05:26    


 


Potassium  3.5 mmol/L (3.5-5.1)   07/17/19  05:26    


 


BUN  7 mg/dL (7-18)   07/17/19  05:26    


 


Creatinine  0.60 mg/dL (0.55-1.3)   07/17/19  05:26    


 


Glucose  88 mg/dL ()   07/17/19  05:26    


 


Total Bilirubin  0.8 mg/dL (0.2-1.0)   07/17/19  05:26    


 


AST  117 U/L (15-37)  H  07/17/19  05:26    


 


ALT  194 U/L (12-78)  H D 07/17/19  05:26    


 


Alkaline Phosphatase  81 U/L ()   07/17/19  05:26    


 


Lipase  165 U/L ()   07/16/19  15:47    








Home Medications: 








Amlodipine Besylate 5 mg PO BEDTIME 08/15/18 


Loratadine 10 mg PO BEDTIME 08/15/18 


levoFLOXacin [Levaquin*] 500 mg PO BEDTIME #7 tab 07/18/19 


metroNIDAZOLE [Flagyl*] 500 mg PO Q6HR #28 tablet 07/18/19 


traMADol HCL [Ultram*] 50 mg PO Q6H PRN #30 tab 07/18/19 





New Medications: 


metroNIDAZOLE [Flagyl*] 500 mg PO Q6HR #28 tablet


levoFLOXacin [Levaquin*] 500 mg PO BEDTIME #7 tab


traMADol HCL [Ultram*] 50 mg PO Q6H PRN #30 tab


 PRN Reason: Pain Scale 5-7 (Moderate)


Diet: Regular


Activity: Ad germán


Followup: 


Oliver Reynolds MD [ACTIVE - CAN ADMIT] - 1 Week (Call to schedule an 

appointment)

## 2019-07-19 NOTE — OP
Date of Procedure:  07/17/2019



Surgeon:  Oliver Reynolds MD



Assistant:  YULISSA Nava



Preoperative Diagnoses:  Acute cholecystitis, symptomatic cholelithiasis.



Postoperative Diagnoses:  Acute cholecystitis, symptomatic cholelithiasis.



Procedure Performed:  Laparoscopic cholecystectomy.



Estimated Blood Loss:  Less than 10 cc.



Specimen:  Gallbladder.



Anesthesia:  General plus local.



Indications:  This is the case of a female, who comes to us with above diagnosis.  Fully explained th
e benefits, alternatives, and risks of laparoscopic, possible open cholecystectomy, which include, bu
t not limited to infection, bleeding, damage to adjacent structures, anesthetic complication, choledo
cholithiasis, bile leak, pancreatitis, MI, and even death.  She also understands this may not relieve
 the symptoms and she might need more than one surgical intervention.  She understood, signed a conse
nt.



Description Of Procedure:  Patient was brought to the operating room, placed in supine position.  Ane
sthesia was done without complication.  Abdominal area was prepped and draped in a sterile fashion.  
Marcaine 0.5% was injected for local anesthetic, followed by sharp into the skin in the infraumbilica
l region.  Incision was carried down to fascia, which was opened under direct vision.  Vicryl #1 plac
ed inside the fascia.  Maryellen trocar was carefully introduced and no bleeding was obtained.  After th
at, I placed 3 more trocars, 5 mm each one of them, 1 in the epigastric area, 2 in the right upper qu
adrant using same technique, which consisted of local anesthetic, sharp incision of the skin, and int
roduction of the trocars under direct vision.  This allowed me to put a grasper in the fundus of the 
gallbladder, another grasper in the infundibulum, retracted the gallbladder in the inferolateral fash
ion, exposing the triangle of Calot and obtaining critical view of safety.  The cystic duct and cysti
c artery were clearly isolated and freed circumferentially and a connection between those and the gal
lbladder was clearly identified.  I proceeded to ligate those by using at least 3 clips proximal, 1 c
lip distally, and ligation in the middle.  The same was done with the cystic artery.  No bile leak.  
No bleeding.  The gallbladder was removed from liver using Bovie cauterizer and removed from abdomina
l cavity using an EndoCatch through the umbilical incision.  The area was inspected once again.  No b
ile leak.  No bleeding.  At that moment, I proceeded to remove the trocars under direct vision.  Defl
ated the pneumoperitoneum.  Closed the fascia with #1 Vicryl.  Irrigated subcu tissue, closed that wi
th 3-0 chromic and skin approximated.  Sponge count and instrument counts were correct.  The patient 
tolerated the procedure well.  The patient was sent to Recovery in stable condition.





EMELYN/LAYNE

DD:  07/18/2019 18:25:57Voice ID:  [id number]

DT:  07/19/2019 03:21:46Report ID:  [id number]

## 2019-09-20 ENCOUNTER — HOSPITAL ENCOUNTER (OUTPATIENT)
Dept: HOSPITAL 97 - OR | Age: 39
Discharge: HOME | End: 2019-09-20
Attending: OTOLARYNGOLOGY
Payer: COMMERCIAL

## 2019-09-20 VITALS — OXYGEN SATURATION: 100 % | DIASTOLIC BLOOD PRESSURE: 73 MMHG | TEMPERATURE: 98.1 F | SYSTOLIC BLOOD PRESSURE: 126 MMHG

## 2019-09-20 DIAGNOSIS — Z88.6: ICD-10-CM

## 2019-09-20 DIAGNOSIS — K14.9: ICD-10-CM

## 2019-09-20 DIAGNOSIS — J35.01: Primary | ICD-10-CM

## 2019-09-20 DIAGNOSIS — Z88.3: ICD-10-CM

## 2019-09-20 DIAGNOSIS — I10: ICD-10-CM

## 2019-09-20 DIAGNOSIS — Z82.49: ICD-10-CM

## 2019-09-20 PROCEDURE — 41599 UNLISTED PX TONGUE FLR MOUTH: CPT

## 2019-09-20 PROCEDURE — 88305 TISSUE EXAM BY PATHOLOGIST: CPT

## 2019-09-20 PROCEDURE — 42826 REMOVAL OF TONSILS: CPT

## 2019-09-20 PROCEDURE — 0CBM7ZX EXCISION OF PHARYNX, VIA NATURAL OR ARTIFICIAL OPENING, DIAGNOSTIC: ICD-10-PCS

## 2019-09-20 PROCEDURE — 81025 URINE PREGNANCY TEST: CPT

## 2019-09-20 PROCEDURE — 88304 TISSUE EXAM BY PATHOLOGIST: CPT

## 2019-09-20 PROCEDURE — 0CTPXZZ RESECTION OF TONSILS, EXTERNAL APPROACH: ICD-10-PCS

## 2019-09-20 RX ADMIN — BUPIVACAINE HYDROCHLORIDE AND EPINEPHRINE ONE ML: 5; 5 INJECTION, SOLUTION EPIDURAL; INTRACAUDAL; PERINEURAL at 07:39

## 2019-09-20 RX ADMIN — BUPIVACAINE HYDROCHLORIDE AND EPINEPHRINE ONE ML: 5; 5 INJECTION, SOLUTION EPIDURAL; INTRACAUDAL; PERINEURAL at 09:38

## 2019-09-20 NOTE — P.OP
Pre-Op Diagnosis: Chronic tonsillitis


Post-Op Diagnosis: Chronic tonsillitis


Procedure: Tonsillectomy


Anesthesia: Other (GA via ETT)


Fluids/ Blood products: Other (crystalloid 600ml)


Estimated blood loss: Other (< 5 ml)


Specimen: Other (Left tonsil, Right tonsil, Right BOT)


Findings: abnormal appearing tissue on R BOT


Complications: None


Implants: None





Indication: Patient persistent issues in spite of good medical management.





Details of Operation: The patient was brought to the operating room and placed 

under general anesthesia via endotracheal tube. The head of bed was turned 90 

degrees. A Shoulder roll was placed and the neck extended. A head drape was 

applied. The McIvor mouth gag was placed and suspended from the Max stand. The 

oxygen concentrate was confirmed with the anesthetist and was less than forty 

percent. Weight-based dexamethasone was administered by the anesthetist. The 

soft palate was palpated and there was no submucous cleft. A red rubber 

catheter was placed in the nose and secured to retract the soft palate. The 

tonsils were noted to be large, cryptil with multiple liths. The left tonsil 

was grasped with a straight Allis clamp. The bovie electocautery was used to 

incision the mucosa over the anterior pillar and identify the tonsillar 

capsule. The tonsil was dissected using cautery and blunt dissection until free 

from soft tissue attachments. A tonsil ball was placed to aid hemostasis. The 

right tonsil was removed in a similar manner. The lingual tonsil tissue on the 

right base of tongue was friable with granulated, irritated appearance but 

without induration.  Due to this, the tonsils will be sent separately.  

Decision was made to biopsy the right base of tongue.  An Allis was used to 

grasp the abnormal appearing tissue and the bovie used to cut away an 

approximately 4mm diameter piece fo tissue





The laryngeal mirror was used to visualize the nasopharynx. The adenoid size 

was minimal. The adenoids were not removed.  Hemostasis was achieved using 

packing and cautery as needed. Blood loss was minimal. All packing was removed.





The tonsillar fossae were injected with 0.5% Marcaine with epinephrine. A total 

of 2.5 mL was used.





A Salum sump orogastric tube was used to decompress the stomach. The red rubber 

catheter was removed and used to suction the nasopharynx and nasal cavity. The 

mouth gag was removed; there was no evidence of injury to the lips, teeth or 

tongue. The mandible was mobile.





Disposition: The patient was then awakened from anesthesia and taken to the 

recovery room in stable condition.

## 2021-07-06 ENCOUNTER — HOSPITAL ENCOUNTER (OUTPATIENT)
Dept: HOSPITAL 97 - OR | Age: 41
Discharge: HOME | End: 2021-07-06
Attending: OBSTETRICS & GYNECOLOGY
Payer: COMMERCIAL

## 2021-07-06 VITALS — SYSTOLIC BLOOD PRESSURE: 105 MMHG | OXYGEN SATURATION: 99 % | DIASTOLIC BLOOD PRESSURE: 44 MMHG

## 2021-07-06 VITALS — TEMPERATURE: 97.7 F

## 2021-07-06 DIAGNOSIS — N92.0: Primary | ICD-10-CM

## 2021-07-06 DIAGNOSIS — N39.3: ICD-10-CM

## 2021-07-06 DIAGNOSIS — N32.81: ICD-10-CM

## 2021-07-06 DIAGNOSIS — I10: ICD-10-CM

## 2021-07-06 PROCEDURE — 0UDB7ZX EXTRACTION OF ENDOMETRIUM, VIA NATURAL OR ARTIFICIAL OPENING, DIAGNOSTIC: ICD-10-PCS

## 2021-07-06 PROCEDURE — 0UJD8ZZ INSPECTION OF UTERUS AND CERVIX, VIA NATURAL OR ARTIFICIAL OPENING ENDOSCOPIC: ICD-10-PCS

## 2021-07-06 PROCEDURE — 81025 URINE PREGNANCY TEST: CPT

## 2021-07-06 PROCEDURE — 88305 TISSUE EXAM BY PATHOLOGIST: CPT

## 2021-07-06 RX ADMIN — LIDOCAINE HYDROCHLORIDE AND EPINEPHRINE ONE ML: 10; 10 INJECTION, SOLUTION INFILTRATION; PERINEURAL at 12:28

## 2021-07-06 RX ADMIN — LIDOCAINE HYDROCHLORIDE AND EPINEPHRINE ONE ML: 10; 10 INJECTION, SOLUTION INFILTRATION; PERINEURAL at 11:23

## 2021-07-06 NOTE — P.BOP
Preoperative diagnosis: menorrhagia


Postoperative diagnosis: same


Primary procedure: hysteroscopy d/c


Estimated blood loss: min


Specimen: EMC


Findings: anterflexed cavity, no masses


Anesthesia: MAC (PCB and MAC)


Complications: None


Transferred to: Recovery Room


Condition: Good

## 2021-07-06 NOTE — OP
Date of Procedure:  07/06/2021



Surgeon:  Colleen Harmon MD



Preoperative Diagnosis:  Menorrhagia.



Postoperative Diagnosis:  Menorrhagia.



Procedure Performed:  Hysteroscopy and dilatation and curettage.



Anesthesia:  MAC plus paracervical block.



Complications:  No complications.



Drains:  No drains.



Specimens:  Endometrial curettings.



Condition:  Stable.



Findings:  Anteflexed cavity.  Endometrial lining slightly irregular and appeared to be slightly yell
owish.  Scope was removed after visualizing the entire cavity.  There were no intracavitary masses.  
Optimal endometrial sampling.



Description Of Procedure:  After informed consent was verified, patient was taken back to OR, placed 
in a supine fashion on the operating table.  After MAC was given, she was placed in a dorsal lithotom
y position.  Speculum was placed to expose the cervix after prep with Betadine x3 on the vulva and va
en, injected with 1% lidocaine, mixed with 1:100,000 epinephrine at 12 o'clock position and at 4 an
d 8 o'clock positions another 7 cc each.  A SlimLine diagnostic hysteroscope was introduced into the 
uterine cavity through the cervical canal under direct vision, and then cavity visualized.  Slightly 
polypoid structure present at the left tubal ostium but rest of the cavity mostly unremarkable with s
ome loose hanging endometrial tissue.  No intracavitary masses.  Scope was pulled out.  Cervix was di
lated to 16-Maltese and curetted with a #1 endometrial curette, handed off for permanent pathology.  I
nstruments, needles, and sponge counts were done and were correct at the end of the case after removi
ng all the instruments.  The patient was recovered from anesthesia and taken to the same-day surgery 
unit for recovery.  The patient's condition was stable.  She has a 1-week followup with me.





SK/LAYNE

DD:  07/06/2021 13:01:39Voice ID:  [id number]

DT:  07/06/2021 20:45:00Report ID:  [id number]

## 2021-07-16 LAB — UA DIPSTICK W REFLEX MICRO PNL UR: (no result)

## 2021-07-19 LAB
HCT VFR BLD CALC: 39.4 % (ref 36–45)
LYMPHOCYTES # SPEC AUTO: 3.3 K/UL (ref 0.7–4.9)
PMV BLD: 8.7 FL (ref 7.6–11.3)
RBC # BLD: 4.76 M/UL (ref 3.86–4.86)

## 2021-07-21 ENCOUNTER — HOSPITAL ENCOUNTER (OUTPATIENT)
Dept: HOSPITAL 97 - OR | Age: 41
Discharge: HOME HEALTH | End: 2021-07-21
Attending: OBSTETRICS & GYNECOLOGY
Payer: COMMERCIAL

## 2021-07-21 VITALS — SYSTOLIC BLOOD PRESSURE: 122 MMHG | DIASTOLIC BLOOD PRESSURE: 60 MMHG | OXYGEN SATURATION: 98 % | TEMPERATURE: 97.4 F

## 2021-07-21 DIAGNOSIS — N32.81: ICD-10-CM

## 2021-07-21 DIAGNOSIS — N92.1: Primary | ICD-10-CM

## 2021-07-21 DIAGNOSIS — I10: ICD-10-CM

## 2021-07-21 DIAGNOSIS — N39.3: ICD-10-CM

## 2021-07-21 PROCEDURE — 88305 TISSUE EXAM BY PATHOLOGIST: CPT

## 2021-07-21 PROCEDURE — 58662 LAPAROSCOPY EXCISE LESIONS: CPT

## 2021-07-21 PROCEDURE — 0TSD4ZZ REPOSITION URETHRA, PERCUTANEOUS ENDOSCOPIC APPROACH: ICD-10-PCS

## 2021-07-21 PROCEDURE — 86901 BLOOD TYPING SEROLOGIC RH(D): CPT

## 2021-07-21 PROCEDURE — 81025 URINE PREGNANCY TEST: CPT

## 2021-07-21 PROCEDURE — 88307 TISSUE EXAM BY PATHOLOGIST: CPT

## 2021-07-21 PROCEDURE — 36415 COLL VENOUS BLD VENIPUNCTURE: CPT

## 2021-07-21 PROCEDURE — 85025 COMPLETE CBC W/AUTO DIFF WBC: CPT

## 2021-07-21 PROCEDURE — 57288 REPAIR BLADDER DEFECT: CPT

## 2021-07-21 PROCEDURE — 58570 TLH UTERUS 250 G OR LESS: CPT

## 2021-07-21 PROCEDURE — 86900 BLOOD TYPING SEROLOGIC ABO: CPT

## 2021-07-21 PROCEDURE — 81015 MICROSCOPIC EXAM OF URINE: CPT

## 2021-07-21 PROCEDURE — 0UB44ZZ EXCISION OF UTERINE SUPPORTING STRUCTURE, PERCUTANEOUS ENDOSCOPIC APPROACH: ICD-10-PCS

## 2021-07-21 PROCEDURE — 81003 URINALYSIS AUTO W/O SCOPE: CPT

## 2021-07-21 PROCEDURE — 0UT94ZZ RESECTION OF UTERUS, PERCUTANEOUS ENDOSCOPIC APPROACH: ICD-10-PCS

## 2021-07-21 PROCEDURE — 86850 RBC ANTIBODY SCREEN: CPT

## 2021-07-26 NOTE — OP
Date of Procedure:  07/21/2021



Surgeon:  Colleen Harmon MD



Assistant:  Brenda Michelle.



Preoperative Diagnoses:  Menorrhagia, stress urinary incontinence.



Postoperative Diagnoses:  Menorrhagia, stress urinary incontinence, endometriosis.



Procedures Performed:  

1.Total laparoscopic hysterectomy.

2.Mid urethral sling (TVTO) cystoscopy.

3.Endometriosis excision.



Anesthesia:  General endotracheal.



Estimated Blood Loss:  Minimal.



Complications:  No complications.



Drains:  Brar catheter.



Condition:  Stable.



Findings:  The patient had no tubes due to the removal in the surgery.  Ovaries completely unremarkab
le.  The uterine specimen removed intact and endometriosis implant in the left uterosacral ligament w
as also removed.



Procedure In Detail:  After informed consent was verified, the patient was taken back to the OR, plac
ed in supine fashion on operating table.  General anesthesia was given.  She was given 2 g of Ancef, 
placed in dorsal lithotomy position.  Abdomen, vulva, vagina, and perineum were prepped and draped in
 a sterile fashion.  Brar was placed to drain the bladder and attached for retrograde filling.  Spec
ulum placed to expose the cervix.  Anterior lip grasped with 2 Allis clamps.  A large VCare was intro
duced and fixed in place.  This area was then draped.  A 1 cm infraumbilical incision made with a sca
lpel and incised through the skin and subcutaneous tissues and the fascia and was tagged with 0 Vicry
l sutures on both sides.  Peritoneum entered sharply.  S-retractors were placed.  Maryellen introduced. 
 Site of entry was checked and unremarkable. 



A 5 left lower quadrant and right lower quadrant ports were placed under direct vision and a 10 supra
pubic port.  The patient was placed in Trendelenburg position and the pelvic and peritoneum were chec
ked systematically.  There was no evidence of any endometriosis __________ implant on the uterosacral
 distally slightly away from the uterus, so plan was to remove the uterine specimen and proceed with 
excision of the implant.  Ovaries appeared to be unremarkable.  Tubes absent.  After all the trocars 
were placed, then LigaSure was used to  the utero-ovarian ligament.  This was taken down and r
ound ligaments were taken down.  Anterior and posterior broad ligaments were opened up and anteriorly
 bladder flap was created posteriorly all the way to the uterosacral ligament.  Probably because of e
ndometriosis, there was some scar tissue.  However, once this was overcome vessels were identified an
d isolated, cauterized with LigaSure, but not cut.  Bladder flap was raised all the way dissecting th
e bladder away from the anterior vaginal wall near the cup, both anteriorly and posteriorly.  The opp
osite side was dissected taking down the utero-ovarian ligament, round ligament and anterior broad li
gament __________ and connecting to the previous dissection.  Posteriorly, this was taken down to the
 right uterosacral.  The ureters were identified from the pelvic brim to the ureteric tunnel ________
__ and once the cup was isolated here on the left side as well as the vessels, then the bipolar baske
t tip and LigaSure both were used for bipolar cautery and they were cut towards the right-sided vesse
ls and cardinal ligament and the left-sided vessels and cardinal ligament.  The bladder was dissected
 away further __________ and circumferential colpotomy performed with a monopolar hook blade and spec
imen detached and pulled out through the vagina.  There was excellent hemostasis.  The closure was do
ne with the help of 0 Vicryl sutures x5.  Two simple angle sutures and 3 figures-of-eight were placed
 in the center.  Once all these were placed, there was an area that I was unable to close the outer l
prasad of the fascia both anteriorly and posteriorly, so we went ahead and took a 2-0 Vicryl stitch and
 this was placed through the fascia to close the connective tissue defect near the cuff.  Once this w
as done, there was excellent closure and hemostasis.  Thorough irrigation and suction were performed.
  Then, the implant was picked up.  __________ excised with the help of LigaSure.  There was no evide
nce of any __________.  All the trocars were removed under direct vision.  Ovaries were normal.  Once
 all the trocars were pulled out, fascia and skin were injected with 0.25% Marcaine marking both at t
he beginning __________. 



After the fascia at the umbilicus was closed with the help of a 0 Vicryl stitch, rest of the incision
s were closed.  A deep suprapubic stitch was placed because it was very difficult to get around the f
ascia and all the skin incisions closed with interrupted 4-0 Vicryl.  Going down vaginally, the mid u
rethral area was picked up with Allis clamps, injected with dilute vasopressin.  Then, a 1 cm incisio
n made in the mid urethral area.  Allis clamps were placed here under the connective tissue.  A tract
 was created towards the ipsilateral obturator space.  Once the membrane was perforated, the scissors
 were opened up to create a wider tract while pulling maximal dissection performed on the left side a
s well.  Once all these were ready, the wing guide was placed through the created channel.  Then, the
 plastic dilator and needle were passed through hugging the inferior pubic ramus exiting at a point, 
2 cm lateral to the groin fold and 1 cm above the external urethral meatus avoiding the adductor tend
on, which was superior. 



Once the plastic dilator was pulled out, then Duyen was placed on the sheath and the mesh.  The dilat
or was cut.  Similar pass taken on the opposite side and once these were both held on 2 Allis clamps,
 tensioning under the urethra was done with the help of Metzenbaum scissors.  The plastic sheaths wer
e pulled out.  The mesh was tensioned appropriately after taking the scissors was visualized.  Thorou
gh antibiotic irrigation was done.  Mesh cut, flushed with the skin.  Skin glued with Dermabond.  The
n vaginal tape was irrigated with antibiotic saline and closed with the help of 3-0 Vicryl in a carolyn
nuous running locked fashion.  Excellent hemostasis.  The Brar was removed.  Cystoscopy was performe
d.  There was good flow of urine from both the ureteric orifices.  No evidence of any trauma to the b
ladder, foreign body, or tumor.  The trigone, the area above the trigone, dome, lateral walls were al
l visualized.  After cystoscopy was completed, bladder was drained.  Brar was placed.  No packing wa
s left.  The patient was recovered from anesthesia and taken to PACU in stable condition.  Instrument
, needle, and sponge counts were correct 

at the end of the case.  Plan was to do a voiding trial in 2 hours before discharge.





SK/LAYNE

DD:  07/25/2021 15:21:38Voice ID:  [id number]

DT:  07/25/2021 17:34:27Report ID:  [id number]

## 2023-02-09 NOTE — P.PN
Subjective


Date of Service: 07/17/19


Chief Complaint: Acute cholecystitis





Is not seen and examined at bedside with RN.  Chart reviewed.  Case discussed 

with general surgery.  This morning patient is awaiting surgical procedure.  

MRCP is negative.  Patient does appear to have mild discomfort in the right 

upper quad





Review of Systems


10-point ROS is otherwise unremarkable





Physical Examination





- Vital Signs


Temperature: 97.5 F


Blood Pressure: 127/74


Pulse: 55


Respirations: 16


Pulse Ox (%): 95





- Physical Exam


General: Alert, In no apparent distress


HEENT: Atraumatic, PERRLA, EOMI


Neck: Supple, JVD not distended


Respiratory: Clear to auscultation bilaterally, Normal air movement


Cardiovascular: Regular rate/rhythm, Normal S1 S2


Gastrointestinal: Normal bowel sounds, No tenderness


Musculoskeletal: No tenderness


Integumentary: No rashes


Neurological: Normal speech, Normal tone, Normal affect


Lymphatics: No axilla or inguinal lymphadenopathy





- Studies


Laboratory Data (last 24 hrs)





07/16/19 15:47: Creatinine 0.87


07/16/19 15:47: WBC 10.6, Hgb 12.7, Hct 38.9, Plt Count 251


07/16/19 15:47: Sodium 140, Potassium 3.4 L, BUN 10, Creatinine 0.92, Glucose 

119 H, Total Bilirubin 0.8, AST 80 H, ALT 88 H, Alkaline Phosphatase 74, Lipase 

165





Medications List Reviewed: Yes





Assessment And Plan





- Current Problems (Diagnosis)


(1) Acute cholecystitis due to biliary calculus


Current Visit: Yes   Status: Acute   


Plan: 


Patient with right upper quadrant pain and having acute cholecystitis on 

abdominal ultrasound


-MRCP is negative for choledocholithiasis


-elevated LFTs this morning.


-general surgery consulted.  Appreciated recommendations at this time


-scheduled for lap choly this morning


-IV antibiotics and will followup post procedure





Discharge Plan: Home


Plan to discharge in: Greater than 2 days





- Code Status/Comfort Care


Code Status Assessed: Yes


Critical Care: No Itraconazole Counseling:  I discussed with the patient the risks of itraconazole including but not limited to liver damage, nausea/vomiting, neuropathy, and severe allergy.  The patient understands that this medication is best absorbed when taken with acidic beverages such as non-diet cola or ginger ale.  The patient understands that monitoring is required including baseline LFTs and repeat LFTs at intervals.  The patient understands that they are to contact us or the primary physician if concerning signs are noted.